# Patient Record
Sex: FEMALE | URBAN - METROPOLITAN AREA
[De-identification: names, ages, dates, MRNs, and addresses within clinical notes are randomized per-mention and may not be internally consistent; named-entity substitution may affect disease eponyms.]

---

## 2023-09-22 ENCOUNTER — APPOINTMENT (RX ONLY)
Dept: URBAN - METROPOLITAN AREA CLINIC 341 | Facility: CLINIC | Age: 28
Setting detail: DERMATOLOGY
End: 2023-09-22

## 2023-09-22 DIAGNOSIS — Z41.9 ENCOUNTER FOR PROCEDURE FOR PURPOSES OTHER THAN REMEDYING HEALTH STATE, UNSPECIFIED: ICD-10-CM | Status: INADEQUATELY CONTROLLED

## 2023-09-22 PROCEDURE — ? OTHER

## 2023-09-22 PROCEDURE — ? CHEMICAL PEEL

## 2023-09-22 PROCEDURE — ? COSMETIC CONSULTATION: CHEMICAL PEELS

## 2023-09-22 ASSESSMENT — LOCATION SIMPLE DESCRIPTION DERM
LOCATION SIMPLE: RIGHT CHEEK
LOCATION SIMPLE: LEFT CHEEK

## 2023-09-22 ASSESSMENT — LOCATION DETAILED DESCRIPTION DERM
LOCATION DETAILED: RIGHT INFERIOR CENTRAL MALAR CHEEK
LOCATION DETAILED: LEFT MID PREAURICULAR CHEEK
LOCATION DETAILED: RIGHT CENTRAL MALAR CHEEK

## 2023-09-22 ASSESSMENT — LOCATION ZONE DERM: LOCATION ZONE: FACE

## 2023-09-22 NOTE — PROCEDURE: OTHER
Note Text (......Xxx Chief Complaint.): This diagnosis correlates with the
Render Risk Assessment In Note?: no
Other (Free Text): Pt is getting her skin prep for her second [place] wedding. She is going on accutane in the winter.
Detail Level: Zone

## 2023-09-22 NOTE — PROCEDURE: CHEMICAL PEEL
Comments: VI PURIFY PEEL PROMO. Applied 3 total layers on the cheeks and 2 on whole face. Patient is getting  in October.

## 2023-09-22 NOTE — PROCEDURE: CHEMICAL PEEL
Price (Use Numbers Only, No Special Characters Or $): 487 Price (Use Numbers Only, No Special Characters Or $): 751

## 2023-09-22 NOTE — HPI: COSMETIC (CHEMICAL PEEL)
[id number]: previous_has_had_a_previous_peel Have You Had A Chemical Peel Before?: has had a previous peel